# Patient Record
Sex: FEMALE | Race: BLACK OR AFRICAN AMERICAN | NOT HISPANIC OR LATINO | Employment: FULL TIME
[De-identification: names, ages, dates, MRNs, and addresses within clinical notes are randomized per-mention and may not be internally consistent; named-entity substitution may affect disease eponyms.]

---

## 2024-01-11 ENCOUNTER — TRANSCRIBE ORDERS (OUTPATIENT)
Dept: OTHER | Age: 24
End: 2024-01-11

## 2024-01-11 DIAGNOSIS — Z01.118 ABNORMAL EXAM OF LEFT EAR: Primary | ICD-10-CM

## 2024-01-16 NOTE — TELEPHONE ENCOUNTER
FUTURE VISIT INFORMATION      FUTURE VISIT INFORMATION:  Date: 3/6/24  Time: 7:30 AM  Location: [facility]  REFERRAL INFORMATION:  Referring provider:  Gem Dickey NP   Referring providers clinic:  [facility]  Reason for visit/diagnosis:  Abnormal exam of left ear [Z01.118], abnormal left ear exam, [place], possible TM Rupture? Drainage that has foul order, ref'd by Gem Dickey APRN, CNP PC     RECORDS REQUESTED FROM      Clinic name Comments Records Status Imaging Status   [facility]  [id number] 1/8/24 note- Gem Dickey NP     *hyperlink to view OV notes in PDF CE

## 2024-03-01 DIAGNOSIS — Z01.10 ENCOUNTER FOR HEARING EXAMINATION: Primary | ICD-10-CM

## 2024-03-05 ENCOUNTER — OFFICE VISIT (OUTPATIENT)
Dept: AUDIOLOGY | Facility: CLINIC | Age: 24
End: 2024-03-05
Attending: NURSE PRACTITIONER
Payer: COMMERCIAL

## 2024-03-05 DIAGNOSIS — H90.12 CONDUCTIVE HEARING LOSS OF LEFT EAR WITH UNRESTRICTED HEARING OF RIGHT EAR: Primary | ICD-10-CM

## 2024-03-05 DIAGNOSIS — Z01.118 ABNORMAL EXAM OF LEFT EAR: ICD-10-CM

## 2024-03-05 DIAGNOSIS — Z01.10 ENCOUNTER FOR HEARING EXAMINATION: ICD-10-CM

## 2024-03-05 PROCEDURE — 92557 COMPREHENSIVE HEARING TEST: CPT | Performed by: AUDIOLOGIST

## 2024-03-05 PROCEDURE — 92550 TYMPANOMETRY & REFLEX THRESH: CPT | Mod: 52 | Performed by: AUDIOLOGIST

## 2024-03-05 NOTE — PROGRESS NOTES
AUDIOLOGY REPORT    SUMMARY: Audiology visit completed. See audiogram for results.      RECOMMENDATIONS: Follow-up with ENT.    Jared Hoskins, CCC-A  Clinical Audiologist  [place] #[id number]

## 2024-03-06 ENCOUNTER — OFFICE VISIT (OUTPATIENT)
Dept: OTOLARYNGOLOGY | Facility: CLINIC | Age: 24
End: 2024-03-06
Attending: NURSE PRACTITIONER
Payer: COMMERCIAL

## 2024-03-06 ENCOUNTER — PRE VISIT (OUTPATIENT)
Dept: OTOLARYNGOLOGY | Facility: CLINIC | Age: 24
End: 2024-03-06

## 2024-03-06 VITALS
HEIGHT: 66 IN | DIASTOLIC BLOOD PRESSURE: 70 MMHG | TEMPERATURE: 96.9 F | HEART RATE: 70 BPM | WEIGHT: 134 LBS | OXYGEN SATURATION: 100 % | SYSTOLIC BLOOD PRESSURE: 113 MMHG | BODY MASS INDEX: 21.53 KG/M2

## 2024-03-06 DIAGNOSIS — H72.92 PERFORATION OF LEFT TYMPANIC MEMBRANE: ICD-10-CM

## 2024-03-06 DIAGNOSIS — H90.12 CONDUCTIVE HEARING LOSS OF LEFT EAR WITH UNRESTRICTED HEARING OF RIGHT EAR: ICD-10-CM

## 2024-03-06 DIAGNOSIS — H92.12 OTORRHEA, LEFT: Primary | ICD-10-CM

## 2024-03-06 PROCEDURE — 87186 SC STD MICRODIL/AGAR DIL: CPT | Performed by: OTOLARYNGOLOGY

## 2024-03-06 PROCEDURE — 92504 EAR MICROSCOPY EXAMINATION: CPT | Performed by: OTOLARYNGOLOGY

## 2024-03-06 PROCEDURE — 99000 SPECIMEN HANDLING OFFICE-LAB: CPT | Performed by: PATHOLOGY

## 2024-03-06 PROCEDURE — 87102 FUNGUS ISOLATION CULTURE: CPT | Performed by: OTOLARYNGOLOGY

## 2024-03-06 PROCEDURE — 99203 OFFICE O/P NEW LOW 30 MIN: CPT | Mod: 25 | Performed by: OTOLARYNGOLOGY

## 2024-03-06 ASSESSMENT — PAIN SCALES - GENERAL: PAINLEVEL: NO PAIN (0)

## 2024-03-06 NOTE — LETTER
3/6/2024       RE: Oneyda Ordoñez  [address]  [place] [id number]     Dear Colleague,    Thank you for referring your patient, Oneyda Ordoñez, to the [facility] EAR NOSE AND THROAT CLINIC [place] at [facility]. Please see a copy of my visit note below.    Oneyda Ordoñez is seen in consultation from Dr. Dickey.  She is a 24 year old female being seen for left otorrhea. During her audiology visit on 03/05/24, she reported chronic left otorrhea since childhood. Reports decreased hearing, fullness, and tinnitus all in the left ear. She avoids swimming and getting water in her ears as that will cause increased drainage. Her right ear has been her better ear, as she hasn't had similar issues. She hasn't used eardrops or antibiotics for treatment of left otorrhea. She denies hearing concerns of the right ear, noise exposure, ear surgery, pain, and dizziness.     Physical examination:  Constitutional:  In no acute distress, appears stated age  Eyes:  Extraocular movements intact, no spontaneous nystagmus  Ears:  Both ears examined under the microscope. Right: TM intact, middle ear aerated. Left: Slight otorrhea seen in her ear canal. There was an anterior perforation with granulation tissue on the TM and around the perforation with otorrhea present. The middle ear mucosa was edematous seen through the perforation. A culture was taken of the granulation tissue.  Respiratory:  No increased work of breathing, wheezing or stridor  Musculoskeletal:  Good upper extremity strength  Skin:  No rashes on the head and neck  Neurologic:  House Brackman 1/6 bilaterally, ambulating normally  Psychiatric:  Alert, normal affect, answering questions appropriately    Audiogram:  Audiogram on 03/05/24 was independently reviewed and compared to a 2015 test. Tymps: Right is WNL, left is flat with large ECV  consistent with TM perforation. Reflexes: right ipsi is elevated, DNT other  conditions due to abnormal left tymp. Normal hearing with conductive overlays at 250 and 1000 Hz (masking dilemma at 4000 Hz) in the right ear and normal to moderate conductive hearing loss in the left ear.  2015 showed normal right hearing, left normal downsloping to mild high frequency loss, no bone lines were tested, bilateral peaked tympanograms.          [facility] were independently reviewed and summarized above.    Assessment and plan: Oneyda Ordoñez is seen in consultation from Dr. Dickey.  She is a 24 year old female being seen for left otorrhea. Collected culture of the left ear from the granulation tissue. She will be contacted with results. Will obtain an updated temporal bone CT after treatment to evaluate for chronic mastoiditis. Return to clinic in 6 weeks.    Scribe Disclosure:   I, Lupe Salvador, am serving as a scribe; to document services personally performed by Tran Stallings MD -based on data collection and the provider's statements to me.     Provider Disclosure:  I agree with above History, Review of Systems, Physical exam and Plan.  I have reviewed the content of the documentation and have edited it as needed. I have personally performed the services documented here and the documentation accurately represents those services and the decisions I have made.      Again, thank you for allowing me to participate in the care of your patient.      Sincerely,    Tran Stallings MD

## 2024-03-06 NOTE — NURSING NOTE
"Chief Complaint   Patient presents with    Consult     Abnormal exam -left ear      Blood pressure 113/70, pulse 70, temperature 96.9  F (36.1  C), height 1.676 m (5' 6\"), weight 60.8 kg (134 lb), SpO2 100%.  Pal Cisse LPN    "

## 2024-03-06 NOTE — PROGRESS NOTES
Oneyda Ordoñez is seen in consultation from Dr. Dickey.  She is a 24 year old female being seen for left otorrhea. During her audiology visit on 03/05/24, she reported chronic left otorrhea since childhood. Reports decreased hearing, fullness, and tinnitus all in the left ear. She avoids swimming and getting water in her ears as that will cause increased drainage. Her right ear has been her better ear, as she hasn't had similar issues. She hasn't used eardrops or antibiotics for treatment of left otorrhea. She denies hearing concerns of the right ear, noise exposure, ear surgery, pain, and dizziness.     Physical examination:  Constitutional:  In no acute distress, appears stated age  Eyes:  Extraocular movements intact, no spontaneous nystagmus  Ears:  Both ears examined under the microscope. Right: TM intact, middle ear aerated. Left: Slight otorrhea seen in her ear canal. There was an anterior perforation with granulation tissue on the TM and around the perforation with otorrhea present. The middle ear mucosa was edematous seen through the perforation. A culture was taken of the granulation tissue.  Respiratory:  No increased work of breathing, wheezing or stridor  Musculoskeletal:  Good upper extremity strength  Skin:  No rashes on the head and neck  Neurologic:  House Brackman 1/6 bilaterally, ambulating normally  Psychiatric:  Alert, normal affect, answering questions appropriately    Audiogram:  Audiogram on 03/05/24 was independently reviewed and compared to a 2015 test. Tymps: Right is WNL, left is flat with large ECV  consistent with TM perforation. Reflexes: right ipsi is elevated, DNT other conditions due to abnormal left tymp. Normal hearing with conductive overlays at 250 and 1000 Hz (masking dilemma at 4000 Hz) in the right ear and normal to moderate conductive hearing loss in the left ear.  2015 showed normal right hearing, left normal downsloping to mild high frequency loss, no bone lines were tested,  bilateral peaked tympanograms.          Outside [facility] were independently reviewed and summarized above.    Assessment and plan: Oneyda Ordoñez is seen in consultation from Dr. Dickey.  She is a 24 year old female being seen for left otorrhea. Collected culture of the left ear from the granulation tissue. She will be contacted with results. Will obtain an updated temporal bone CT after treatment to evaluate for chronic mastoiditis. Return to clinic in 6 weeks.    Scribe Disclosure:   I, Lupe Salvador, am serving as a scribe; to document services personally performed by Tran Stallings MD -based on data collection and the provider's statements to me.     Provider Disclosure:  I agree with above History, Review of Systems, Physical exam and Plan.  I have reviewed the content of the documentation and have edited it as needed. I have personally performed the services documented here and the documentation accurately represents those services and the decisions I have made.

## 2024-03-06 NOTE — PATIENT INSTRUCTIONS
You were seen in the ENT Clinic today by Dr. Stallings. If you have any questions or concerns after your appointment, please contact us (see below)    The following has been recommended for you based upon your appointment today:  Right ear culture- will call with result  CT on the same day as follow up    Please return to clinic on 4/18    How to Contact Us:  Send a TriplePulse message to your provider. Our team will respond to you via TriplePulse. Occasionally, we will need to call you to get further information.  For urgent matters (Monday-Friday), call the ENT Clinic: [phone] and speak with a call center team member - they will route your call appropriately.   If you'd like to speak directly with a nurse, please find our contact information below. We do our best to check voicemail frequently throughout the day, and will work to call you back within 1-2 days. For urgent matters, please use the general clinic phone numbers listed above.    Sarah OLMEDO, RN, BSN  RN Care Coordinator, ENT Clinic  [facility]  Direct: [phone]  Abby ALTMAN LPN  Direct: [phone]

## 2024-03-10 ENCOUNTER — HEALTH MAINTENANCE LETTER (OUTPATIENT)
Age: 24
End: 2024-03-10

## 2024-03-10 LAB
BACTERIA SPEC CULT: ABNORMAL

## 2024-03-11 DIAGNOSIS — H92.12 OTORRHEA, LEFT: Primary | ICD-10-CM

## 2024-03-11 RX ORDER — SULFAMETHOXAZOLE/TRIMETHOPRIM 800-160 MG
1 TABLET ORAL 2 TIMES DAILY
Qty: 42 TABLET | Refills: 0 | Status: SHIPPED | OUTPATIENT
Start: 2024-03-11 | End: 2024-04-01

## 2024-03-11 RX ORDER — AMOXICILLIN 500 MG/1
500 TABLET, FILM COATED ORAL 2 TIMES DAILY
Qty: 42 TABLET | Refills: 0 | Status: SHIPPED | OUTPATIENT
Start: 2024-03-11 | End: 2024-04-01

## 2024-03-11 RX ORDER — SULFACETAMIDE SODIUM 100 MG/ML
5 SOLUTION/ DROPS OPHTHALMIC 2 TIMES DAILY
Qty: 10 ML | Refills: 1 | Status: SHIPPED | OUTPATIENT
Start: 2024-03-11 | End: 2024-04-01

## 2024-03-12 ENCOUNTER — TELEPHONE (OUTPATIENT)
Dept: OTOLARYNGOLOGY | Facility: CLINIC | Age: 24
End: 2024-03-12
Payer: COMMERCIAL

## 2024-03-12 NOTE — TELEPHONE ENCOUNTER
We moved this patient appts up to be seen sooner. Originally on 6/13 moved up to 4/17 starting @ 11:20am for CT then seeing provider @ 12:00pm

## 2024-03-19 ENCOUNTER — TELEPHONE (OUTPATIENT)
Dept: OTOLARYNGOLOGY | Facility: CLINIC | Age: 24
End: 2024-03-19
Payer: COMMERCIAL

## 2024-04-04 LAB — BACTERIA SPEC CULT: NO GROWTH

## 2024-06-13 ENCOUNTER — TELEPHONE (OUTPATIENT)
Dept: OTOLARYNGOLOGY | Facility: CLINIC | Age: 24
End: 2024-06-13

## 2024-06-13 NOTE — TELEPHONE ENCOUNTER
Patient confirmed scheduled appointment:  Date: 8/15  Time: 5pm  Visit type: CT  Provider:   [place] [facility]  Testing/imaging:   Additional notes:

## 2024-08-15 ENCOUNTER — TELEPHONE (OUTPATIENT)
Dept: OTOLARYNGOLOGY | Facility: CLINIC | Age: 24
End: 2024-08-15

## 2025-01-23 NOTE — PROGRESS NOTES
[facility] EAR NOSE AND THROAT CLINIC [address] [address]  Phone: [phone]  Fax: [phone]    Patient:  Oneyda Ordoñez, Date of birth 2000  Date of Visit:  01/29/2025  Referring Provider Referred Self      Assessment & Plan      Oneyda Ordoñez is a 24 year old female being seen for follow-up. Physical examination shown continued infection in the left ear, and culture of the otorrhea was collected. The left perforation appears stable. Tympanoplasty was recommended to repair the tympanic membrane, but she has declined at this time. She has been advised to follow-up with one of our APPs for routine check-ups. She has been strongly advised to follow-up on her visits, as failure to do so can result in persistence or recurrent infection. She has been encouraged to keep the ear dry, including using a Vaseline-coated cotton ball while bathing and plugs while swimming. We will reach out with the results of the culture, and update the treatment plan based on the findings if necessary. She has been encouraged to reach out if symptoms worsen or if new concerns arise. She is in agreement with this plan, and will return in 6-weeks.    HPI  Oneyda Ordoñez is a 24 year old female being seen for follow-up. She initially presented to audiology on 03/05/24, reporting chronic left otorrhea since childhood. Reports decreased hearing, fullness, and tinnitus all in the left ear. She avoids swimming and getting water in her ears as that will cause increased drainage. Her right ear has been her better ear, as she hasn't had similar issues. She was previously seen on 03/06/2024, where she was found to have an anterior perforation with granulation tissue on the left TM with otorrhea and a culture was taken from the granulation tissue. It was decided to obtain an updated temporal bone CT after treatment to evaluate for chronic mastoiditis. The culture returned positive for two  different bacteria and she was prescribed two oral antibiotics. Today, she reports continued drainage. Otherwise, she is doing well.     Respiratory Aerobic Bacterial Culture Collected 3/6/2024  Dx: Otorrhea, left    Specimen Information: Ear, Left; Swab   0 Result Notes  Culture 3+ Staphylococcus aureus Abnormal    4+ Gram positive bacilli, resembling diphtheroids Abnormal    No further identification  Susceptibilities not routinely done   1+ Eikenella corrodens Abnormal           /67 (BP Location: Right arm, Patient Position: Chair, Cuff Size: Adult Large)    Physical examination:  female in no acute distress.  Alert and answering questions appropriately.  HB 1/6 bilaterally.  Both ears examined under the microscope. Right TM intact with an aerated middle ear and a little bit of myringosclerosis on the TM. Left ear canal is clear, and there is a little bit of dried crust on the TM. She has a very stable appearing anterior  20% perforation with granulation tissue on the edges of the perforation with purulent otorrhea. This was cultured. The middle ear is seen through the perforation and appears relatively clear.     Scribe Disclosure:   I, Felecia Rosas, am serving as a scribe; to document services personally performed by Tran Stallings MD -based on data collection and the provider's statements to me.     Provider Disclosure:  I agree with above History, Review of Systems, Physical exam and Plan.  I have reviewed the content of the documentation and have edited it as needed. I have personally performed the services documented here and the documentation accurately represents those services and the decisions I have made.      Electronically signed by:  ***

## 2025-01-29 ENCOUNTER — OFFICE VISIT (OUTPATIENT)
Dept: OTOLARYNGOLOGY | Facility: CLINIC | Age: 25
End: 2025-01-29
Payer: COMMERCIAL

## 2025-01-29 VITALS — DIASTOLIC BLOOD PRESSURE: 67 MMHG | SYSTOLIC BLOOD PRESSURE: 104 MMHG

## 2025-01-29 DIAGNOSIS — H72.92 PERFORATION OF LEFT TYMPANIC MEMBRANE: ICD-10-CM

## 2025-01-29 DIAGNOSIS — H92.12 OTORRHEA, LEFT: Primary | ICD-10-CM

## 2025-01-29 PROCEDURE — 99212 OFFICE O/P EST SF 10 MIN: CPT | Mod: 25 | Performed by: OTOLARYNGOLOGY

## 2025-01-29 PROCEDURE — 87070 CULTURE OTHR SPECIMN AEROBIC: CPT | Performed by: OTOLARYNGOLOGY

## 2025-01-29 PROCEDURE — 92504 EAR MICROSCOPY EXAMINATION: CPT | Performed by: OTOLARYNGOLOGY

## 2025-01-29 PROCEDURE — 87102 FUNGUS ISOLATION CULTURE: CPT | Performed by: OTOLARYNGOLOGY

## 2025-01-29 PROCEDURE — 99000 SPECIMEN HANDLING OFFICE-LAB: CPT | Performed by: PATHOLOGY

## 2025-01-29 ASSESSMENT — PAIN SCALES - GENERAL: PAINLEVEL_OUTOF10: NO PAIN (0)

## 2025-01-29 NOTE — PROGRESS NOTES
Chief Complaint   Patient presents with    Follow Up     Blood pressure 104/67.  Abby Winters LPN     What Type Of Note Output Would You Prefer (Optional)?: Standard Output How Severe Is Your Acne?: mild Is This A New Presentation, Or A Follow-Up?: Acne

## 2025-01-29 NOTE — PATIENT INSTRUCTIONS
You were seen in the ENT Clinic today by Dr. Stallings. If you have any questions or concerns after your appointment, please contact us (see below)    The following has been recommended for you based upon your appointment today:  Left ear culture taken - we will call you with results.     Please return to clinic in 6 weeks with either our NP or PA.    How to Contact Us:  Send a Auto Secure message to your provider. Our team will respond to you via Auto Secure. Occasionally, we will need to call you to get further information.  For urgent matters (Monday-Friday), call the ENT Clinic: [phone] and speak with a call center team member - they will route your call appropriately.   If you'd like to speak directly with a nurse, please find our contact information below. We do our best to check voicemail frequently throughout the day, and will work to call you back within 1-2 days. For urgent matters, please use the general clinic phone numbers listed above.    Sarah OLMEDO, RN, BSN  RN Care Coordinator, ENT Clinic  [facility]  Direct: [phone]  Abby ALTMAN LPN  Direct: [phone]

## 2025-01-29 NOTE — Clinical Note
1/29/2025       RE: Oneyda Ordoñez  [address]  [place] [address]     Dear Colleague,    Thank you for referring your patient, Oneyda Ordoñez, to the [facility] EAR NOSE AND THROAT CLINIC [place] [facility]. Please see a copy of my visit note below.      [facility] EAR NOSE AND THROAT CLINIC [place]  [address] [place] [address]  4TH FLOOR  [facility] [address]  Phone: [phone]  Fax: [phone]    Patient:  Oneyda Ordoñez, Date of birth 2000  Date of Visit:  01/23/2025  Referring Provider Referred Self      Assessment & Plan     Oneyda Ordoñez is a 24 year old female being seen for follow-up. ***    HPI  Oneyda Ordoñez is a 24 year old female being seen for follow-up. She initially presented to audiology on 03/05/24, reporting chronic left otorrhea since childhood. Reports decreased hearing, fullness, and tinnitus all in the left ear. She avoids swimming and getting water in her ears as that will cause increased drainage. Her right ear has been her better ear, as she hasn't had similar issues. She was previously seen on 03/06/2024, where she was found to have an anterior perforation with granulation tissue on the left TM with otorrhea and a culture was taken from the granulation tissue. It was decided to obtain an updated temporal bone CT after treatment to evaluate for chronic mastoiditis. The culture returned positive for two different bacteria and she was prescribed two oral antibiotics.     Respiratory Aerobic Bacterial Culture Collected 3/6/2024  Dx: Otorrhea, left    Specimen Information: Ear, Left; Swab   0 Result Notes  Culture 3+ Staphylococcus aureus Abnormal    4+ Gram positive bacilli, resembling diphtheroids Abnormal    No further identification  Susceptibilities not routinely done   1+ Eikenella corrodens Abnormal           There were no vitals taken for this visit.   Physical examination:  female in no acute distress.  Alert  and answering questions appropriately.  HB 1/6 bilaterally.  {RIGHT, LEFT-INITIAL CAP:5607} ears examined under the microscope.    Audiogram:  Audiogram was independently reviewed. ***    Imaging:   *** CT TEMPORAL W/O CONTRAST 11/08/2024    Scribe Disclosure:   I, Felecia Alison, am serving as a scribe; to document services personally performed by Tran Stallings MD -based on data collection and the provider's statements to me.     Provider Disclosure:  I agree with above History, Review of Systems, Physical exam and Plan.  I have reviewed the content of the documentation and have edited it as needed. I have personally performed the services documented here and the documentation accurately represents those services and the decisions I have made.      Electronically signed by:  ***      Chief Complaint   Patient presents with    Follow Up     Blood pressure 104/67.  Abby Winters [facility] EAR NOSE AND THROAT CLINIC [address] [address]  Phone: [phone]  Fax: [phone]    Patient:  Oneyda Ordoñez, Date of birth 2000  Date of Visit:  01/29/2025  Referring Provider Referred Self      Assessment & Plan     Oneyda Ordoñez is a 24 year old female being seen for follow-up. Physical examination shown continued infection in the left ear, and a culture was collected. The left perforation appears stable. Tympanoplasty was recommended to repair the tympanic membrane, but she has declined at this time. She has been advised to follow-up with one of our APPs for routine check-ups.     She has been strongly advised to follow-up on her visits, as failure to do so can result in persistence or recurrent infection. She has been encouraged to keep the ear dry, including using a    We will reach out with the results of the culture, and update the treatment plan based on the findings if necessary. She has been encouraged to reach out if symptoms worsen or if new  concerns arise. She is in agreement with this plan, and will return in ***    HPI  Oneyda Ordoñez is a 24 year old female being seen for follow-up. She initially presented to audiology on 03/05/24, reporting chronic left otorrhea since childhood. Reports decreased hearing, fullness, and tinnitus all in the left ear. She avoids swimming and getting water in her ears as that will cause increased drainage. Her right ear has been her better ear, as she hasn't had similar issues. She was previously seen on 03/06/2024, where she was found to have an anterior perforation with granulation tissue on the left TM with otorrhea and a culture was taken from the granulation tissue. It was decided to obtain an updated temporal bone CT after treatment to evaluate for chronic mastoiditis. The culture returned positive for two different bacteria and she was prescribed two oral antibiotics. Today, she reports continued drainage. Otherwise, she is doing well.     Respiratory Aerobic Bacterial Culture Collected 3/6/2024  Dx: Otorrhea, left    Specimen Information: Ear, Left; Swab   0 Result Notes  Culture 3+ Staphylococcus aureus Abnormal    4+ Gram positive bacilli, resembling diphtheroids Abnormal    No further identification  Susceptibilities not routinely done   1+ Eikenella corrodens Abnormal           /67 (BP Location: Right arm, Patient Position: Chair, Cuff Size: Adult Large)    Physical examination:  female in no acute distress.  Alert and answering questions appropriately.  HB 1/6 bilaterally.  {RIGHT, LEFT-INITIAL CAP:5607} ears examined under the microscope.        Scribe Disclosure:   I, Felecia Rosas, am serving as a scribe; to document services personally performed by Tran Stallings MD -based on data collection and the provider's statements to me.     Provider Disclosure:  I agree with above History, Review of Systems, Physical exam and Plan.  I have reviewed the content of the documentation and have edited it as  needed. I have personally performed the services documented here and the documentation accurately represents those services and the decisions I have made.      Electronically signed by:  ***        Again, thank you for allowing me to participate in the care of your patient.      Sincerely,    Tran Stallings MD

## 2025-01-30 LAB
BACTERIA SPEC CULT: ABNORMAL
BACTERIA SPEC CULT: ABNORMAL
BACTERIA SPEC CULT: NORMAL

## 2025-02-01 LAB — BACTERIA SPEC CULT: ABNORMAL

## 2025-02-03 DIAGNOSIS — A49.01 BACTERIAL INFECTION DUE TO STAPHYLOCOCCUS AUREUS: Primary | ICD-10-CM

## 2025-02-03 RX ORDER — PREDNISOLONE ACETATE 10 MG/ML
5 SUSPENSION/ DROPS OPHTHALMIC 2 TIMES DAILY
Qty: 10 ML | Refills: 1 | Status: SHIPPED | OUTPATIENT
Start: 2025-02-03 | End: 2025-02-24

## 2025-02-03 RX ORDER — SULFACETAMIDE SODIUM 100 MG/ML
5 SOLUTION/ DROPS OPHTHALMIC 2 TIMES DAILY
Qty: 10 ML | Refills: 1 | Status: SHIPPED | OUTPATIENT
Start: 2025-02-03 | End: 2025-02-24

## 2025-02-06 LAB — BACTERIA SPEC CULT: NORMAL

## 2025-02-13 LAB — BACTERIA SPEC CULT: NORMAL

## 2025-02-20 LAB — BACTERIA SPEC CULT: NORMAL

## 2025-02-26 LAB — BACTERIA SPEC CULT: NO GROWTH

## 2025-03-12 ENCOUNTER — OFFICE VISIT (OUTPATIENT)
Dept: OTOLARYNGOLOGY | Facility: CLINIC | Age: 25
End: 2025-03-12
Payer: COMMERCIAL

## 2025-03-12 VITALS
WEIGHT: 146 LBS | DIASTOLIC BLOOD PRESSURE: 71 MMHG | HEART RATE: 64 BPM | TEMPERATURE: 96.6 F | BODY MASS INDEX: 23.57 KG/M2 | OXYGEN SATURATION: 99 % | SYSTOLIC BLOOD PRESSURE: 122 MMHG

## 2025-03-12 DIAGNOSIS — H72.92 PERFORATION OF LEFT TYMPANIC MEMBRANE: Primary | ICD-10-CM

## 2025-03-12 ASSESSMENT — PAIN SCALES - GENERAL: PAINLEVEL_OUTOF10: NO PAIN (0)

## 2025-03-12 NOTE — NURSING NOTE
Chief Complaint   Patient presents with    RECHECK     Follow up     Blood pressure 122/71, pulse 64, temperature (!) 96.6  F (35.9  C), weight 66.2 kg (146 lb), SpO2 99%.  Pal Cisse LPN

## 2025-03-12 NOTE — PROGRESS NOTES
Otolaryngology Clinic  March 12, 2025    HPI:  Oneyda Ordoñez is here for a recheck of their left ear. History of left perforation with chronic otorrhea. Last seen in clinic 1/29/25 by Dr. Stallings. Culture demonstrated staph. Treated with vasocidin. Dr. Stallings recommended tympanoplasty. Patient declined. Recommended follow up in 1 month for recheck.    Today, patient states that they are doing well. Patient denies any otalgia, otorrhea or dizziness. States that hearing in the left ear has improved. Following dry ear precautions.    Otologic microscope exam:    Patient's ear pathology required use of the binocular microscope for the purpose of cleaning and improving visualization in order to assure a more accurate diagnostic evaluation.    Right ear was examined under the microscope.  Once cleaned, TM visualized under microscope. Normal appearing TM, nicely aerated middle ear space.     Left ear was also examined under the microscope. Ear canal is clean and dry. Central perforation is clean and dry with clean middle ear space.     Assessment and Plan:  1. Perforation of left tympanic membrane (Primary)  The patient presents for recheck of their left ear due to TM perforation and chronic otorrhea. Left ear is clean and dry on exam. TM is stable with clean middle ear space. Recommended that patient continue dry ear precautions and follow up in 3 months for ear recheck. Sooner for any new drainage, pain or change in hearing. Encouraged patient to contact clinic if they decide to proceed with recommended tympanoplasty.     Patient will follow up in 3 months for ear recheck.    Chacha Willis DNP, APRN, CNP  Otolaryngology  Head & Neck Surgery  [phone]    10 minutes spent on the date of the encounter doing chart review, history and exam, documentation and further activities per the note excluding time spent examining ears under microscopy.

## 2025-03-12 NOTE — LETTER
3/12/2025       RE: Oneyda Ordoñez  [address]  [place] [address]     Dear Colleague,    Thank you for referring your patient, Oneyda Ordoñez, to the [facility] [place] [facility]. Please see a copy of my visit note below.      Otolaryngology Clinic  March 12, 2025    HPI:  Oneyda Ordoñez is here for a recheck of their left ear. History of left perforation with chronic otorrhea. Last seen in clinic 1/29/25 by Dr. Stallings. Culture demonstrated staph. Treated with vasocidin. Dr. Stallings recommended tympanoplasty. Patient declined. Recommended follow up in 1 month for recheck.    Today, patient states that they are doing well. Patient denies any otalgia, otorrhea or dizziness. States that hearing in the left ear has improved. Following dry ear precautions.    Otologic microscope exam:    Patient's ear pathology required use of the binocular microscope for the purpose of cleaning and improving visualization in order to assure a more accurate diagnostic evaluation.    Right ear was examined under the microscope.  Once cleaned, TM visualized under microscope. Normal appearing TM, nicely aerated middle ear space.     Left ear was also examined under the microscope. Ear canal is clean and dry. Central perforation is clean and dry with clean middle ear space.     Assessment and Plan:  1. Perforation of left tympanic membrane (Primary)  The patient presents for recheck of their left ear due to TM perforation and chronic otorrhea. Left ear is clean and dry on exam. TM is stable with clean middle ear space. Recommended that patient continue dry ear precautions and follow up in 3 months for ear recheck. Sooner for any new drainage, pain or change in hearing. Encouraged patient to contact clinic if they decide to proceed with recommended tympanoplasty.     Patient will follow up in 3 months for ear recheck.    Chacha Willis DNP, APRN,  CNP  Otolaryngology  Head & Neck Surgery  [phone]    10 minutes spent on the date of the encounter doing chart review, history and exam, documentation and further activities per the note excluding time spent examining ears under microscopy.        Again, thank you for allowing me to participate in the care of your patient.      Sincerely,    Yun Willis, NP

## 2025-03-16 ENCOUNTER — HEALTH MAINTENANCE LETTER (OUTPATIENT)
Age: 25
End: 2025-03-16